# Patient Record
Sex: MALE | Race: WHITE | NOT HISPANIC OR LATINO | Employment: FULL TIME | URBAN - METROPOLITAN AREA
[De-identification: names, ages, dates, MRNs, and addresses within clinical notes are randomized per-mention and may not be internally consistent; named-entity substitution may affect disease eponyms.]

---

## 2019-03-16 RX ORDER — ESCITALOPRAM OXALATE 20 MG/1
20 TABLET ORAL DAILY
Qty: 30 TABLET | Refills: 11 | Status: SHIPPED | OUTPATIENT
Start: 2019-03-16 | End: 2019-03-16

## 2019-03-16 RX ORDER — ESCITALOPRAM OXALATE 20 MG/1
TABLET ORAL
Qty: 30 TABLET | Refills: 11 | Status: SHIPPED | OUTPATIENT
Start: 2019-03-16 | End: 2019-05-15

## 2019-05-15 RX ORDER — ESCITALOPRAM OXALATE 10 MG/1
10 TABLET ORAL DAILY
Qty: 30 TABLET | Refills: 11 | Status: SHIPPED | OUTPATIENT
Start: 2019-05-15 | End: 2021-07-21 | Stop reason: SDUPTHER

## 2019-08-16 ENCOUNTER — OFFICE VISIT (OUTPATIENT)
Dept: FAMILY MEDICINE | Facility: CLINIC | Age: 34
End: 2019-08-16
Payer: COMMERCIAL

## 2019-08-16 VITALS
WEIGHT: 190 LBS | TEMPERATURE: 97 F | SYSTOLIC BLOOD PRESSURE: 123 MMHG | OXYGEN SATURATION: 98 % | BODY MASS INDEX: 28.14 KG/M2 | HEART RATE: 73 BPM | DIASTOLIC BLOOD PRESSURE: 78 MMHG | HEIGHT: 69 IN | RESPIRATION RATE: 16 BRPM

## 2019-08-16 DIAGNOSIS — Z00.01 ENCOUNTER FOR PREVENTATIVE ADULT HEALTH CARE EXAM WITH ABNORMAL FINDINGS: Primary | ICD-10-CM

## 2019-08-16 PROBLEM — F17.200 SMOKER: Status: RESOLVED | Noted: 2019-08-16 | Resolved: 2019-08-16

## 2019-08-16 PROCEDURE — 99395 PR PREVENTIVE VISIT,EST,18-39: ICD-10-PCS | Mod: S$GLB,,, | Performed by: NURSE PRACTITIONER

## 2019-08-16 PROCEDURE — 99395 PREV VISIT EST AGE 18-39: CPT | Mod: S$GLB,,, | Performed by: NURSE PRACTITIONER

## 2019-08-16 NOTE — PROGRESS NOTES
Subjective:      Patient ID: Ronnell Grande is a 33 y.o. male.    Chief Complaint: Follow-up (6mn f.u anxiety. ) and Annual Exam      Here for annual wellness visit.  No acute issues reported.  Tolerating all medications well.       ROS:   Review of Systems   Constitutional: Negative.    Respiratory: Negative.    Cardiovascular: Negative.    Gastrointestinal: Negative.    Genitourinary: Negative.    Musculoskeletal: Negative.    Neurological: Negative.    Psychiatric/Behavioral: Negative.      Objective:   Physical Exam   Constitutional: He is oriented to person, place, and time. He appears well-developed and well-nourished.   Eyes: Pupils are equal, round, and reactive to light. No scleral icterus.   Neck: Normal range of motion. Neck supple.   Cardiovascular: Normal rate, regular rhythm and normal heart sounds.   No murmur heard.  Pulmonary/Chest: Effort normal and breath sounds normal.   Abdominal: Soft. Bowel sounds are normal.   Musculoskeletal: Normal range of motion. He exhibits no edema.   Lymphadenopathy:     He has no cervical adenopathy.        Right: No supraclavicular adenopathy present.        Left: No supraclavicular adenopathy present.   Neurological: He is alert and oriented to person, place, and time.   Skin: Skin is warm and dry.   Psychiatric: He has a normal mood and affect. His behavior is normal. Judgment and thought content normal.   Nursing note and vitals reviewed.    Assessment:     1. Encounter for preventative adult health care exam with abnormal findings      Plan:     Problem List Items Addressed This Visit     None      Visit Diagnoses     Encounter for preventative adult health care exam with abnormal findings    -  Primary    Hold off on Labs until 6 mts. Wellness Screening form completed for work. RTC 6 mts.

## 2020-02-12 RX ORDER — DICLOFENAC POTASSIUM 50 MG/1
50 TABLET, FILM COATED ORAL 3 TIMES DAILY PRN
Qty: 30 TABLET | Refills: 0 | Status: SHIPPED | OUTPATIENT
Start: 2020-02-12 | End: 2020-02-19

## 2020-02-12 RX ORDER — CYCLOBENZAPRINE HCL 10 MG
10 TABLET ORAL 3 TIMES DAILY PRN
Qty: 30 TABLET | Refills: 0 | Status: SHIPPED | OUTPATIENT
Start: 2020-02-12 | End: 2020-02-22

## 2020-02-19 RX ORDER — DICLOFENAC POTASSIUM 50 MG/1
TABLET, FILM COATED ORAL
Qty: 30 TABLET | Refills: 3 | Status: SHIPPED | OUTPATIENT
Start: 2020-02-19 | End: 2020-05-21 | Stop reason: SDUPTHER

## 2020-05-21 ENCOUNTER — TELEPHONE (OUTPATIENT)
Dept: FAMILY MEDICINE | Facility: CLINIC | Age: 35
End: 2020-05-21

## 2020-05-21 RX ORDER — DICLOFENAC POTASSIUM 50 MG/1
TABLET, FILM COATED ORAL
Qty: 30 TABLET | Refills: 3 | Status: SHIPPED | OUTPATIENT
Start: 2020-05-21 | End: 2023-10-18

## 2020-05-21 RX ORDER — CYCLOBENZAPRINE HCL 10 MG
10 TABLET ORAL 3 TIMES DAILY PRN
Qty: 30 TABLET | Refills: 0 | Status: SHIPPED | OUTPATIENT
Start: 2020-05-21 | End: 2020-05-31

## 2020-05-22 ENCOUNTER — OFFICE VISIT (OUTPATIENT)
Dept: FAMILY MEDICINE | Facility: CLINIC | Age: 35
End: 2020-05-22
Payer: COMMERCIAL

## 2020-05-22 VITALS
HEIGHT: 69 IN | BODY MASS INDEX: 28.14 KG/M2 | WEIGHT: 190 LBS | RESPIRATION RATE: 16 BRPM | TEMPERATURE: 99 F | DIASTOLIC BLOOD PRESSURE: 77 MMHG | OXYGEN SATURATION: 99 % | HEART RATE: 82 BPM | SYSTOLIC BLOOD PRESSURE: 122 MMHG

## 2020-05-22 DIAGNOSIS — M54.42 ACUTE LEFT-SIDED LOW BACK PAIN WITH LEFT-SIDED SCIATICA: ICD-10-CM

## 2020-05-22 DIAGNOSIS — M54.16 LEFT LUMBAR RADICULOPATHY: Primary | ICD-10-CM

## 2020-05-22 PROCEDURE — 3008F PR BODY MASS INDEX (BMI) DOCUMENTED: ICD-10-PCS | Mod: CPTII,S$GLB,, | Performed by: NURSE PRACTITIONER

## 2020-05-22 PROCEDURE — 96372 THER/PROPH/DIAG INJ SC/IM: CPT | Mod: S$GLB,,, | Performed by: NURSE PRACTITIONER

## 2020-05-22 PROCEDURE — 99213 OFFICE O/P EST LOW 20 MIN: CPT | Mod: 25,S$GLB,, | Performed by: NURSE PRACTITIONER

## 2020-05-22 PROCEDURE — 96372 PR INJECTION,THERAP/PROPH/DIAG2ST, IM OR SUBCUT: ICD-10-PCS | Mod: S$GLB,,, | Performed by: NURSE PRACTITIONER

## 2020-05-22 PROCEDURE — 3008F BODY MASS INDEX DOCD: CPT | Mod: CPTII,S$GLB,, | Performed by: NURSE PRACTITIONER

## 2020-05-22 PROCEDURE — 99213 PR OFFICE/OUTPT VISIT, EST, LEVL III, 20-29 MIN: ICD-10-PCS | Mod: 25,S$GLB,, | Performed by: NURSE PRACTITIONER

## 2020-05-22 RX ORDER — BETAMETHASONE SODIUM PHOSPHATE AND BETAMETHASONE ACETATE 3; 3 MG/ML; MG/ML
12 INJECTION, SUSPENSION INTRA-ARTICULAR; INTRALESIONAL; INTRAMUSCULAR; SOFT TISSUE
Status: COMPLETED | OUTPATIENT
Start: 2020-05-22 | End: 2020-05-22

## 2020-05-22 RX ORDER — BETAMETHASONE SODIUM PHOSPHATE AND BETAMETHASONE ACETATE 3; 3 MG/ML; MG/ML
12 INJECTION, SUSPENSION INTRA-ARTICULAR; INTRALESIONAL; INTRAMUSCULAR; SOFT TISSUE ONCE
Qty: 2 ML | Refills: 0 | Status: SHIPPED | OUTPATIENT
Start: 2020-05-22 | End: 2020-05-22 | Stop reason: CLARIF

## 2020-05-22 RX ADMIN — BETAMETHASONE SODIUM PHOSPHATE AND BETAMETHASONE ACETATE 12 MG: 3; 3 INJECTION, SUSPENSION INTRA-ARTICULAR; INTRALESIONAL; INTRAMUSCULAR; SOFT TISSUE at 10:05

## 2020-05-22 NOTE — ASSESSMENT & PLAN NOTE
2nd occurrence in a yr.  Will give Celestone on L side.  Continue Flexeril as prescribed.  Hold diclofenac until Monday.     If he continues to have this issue moving forward, will recommend imaging versus referral to Dr. Conde for injection

## 2020-05-22 NOTE — PROGRESS NOTES
Subjective:      Patient ID: Ronnell Grande is a 34 y.o. male.    Chief Complaint: Back Pain (Low back pain; Lt side mostly; Numbness since Monday.)      34-year-old male with a history of lower back pain here today for an acute exacerbation with radiation down the left leg.  This is his 2nd occurrence in a year.   This occurred about a week ago while he was doing burpees at [employer].  Patient states that he hyperextended his back and immediately felt a pain in the left side.  Pain is worse with bending and twisting.  Radiates all the way down to his left calf.  Sitting is unaffected.  Denies bowel or bladder incontinence.  Positive tenderness in the left glued medius and Lumbar region. No other issues reported.     Past Medical History:   Diagnosis Date    EBV positive mononucleosis syndrome     Generalized anxiety disorder     Low vitamin D level     Smoker       Social History     Socioeconomic History    Marital status:      Spouse name: Not on file    Number of children: Not on file    Years of education: Not on file    Highest education level: Not on file   Occupational History    Not on file   Social Needs    Financial resource strain: Not on file    Food insecurity:     Worry: Not on file     Inability: Not on file    Transportation needs:     Medical: Not on file     Non-medical: Not on file   Tobacco Use    Smoking status: Current Some Day Smoker     Types: Cigarettes    Smokeless tobacco: Never Used   Substance and Sexual Activity    Alcohol use: Never    Drug use: Never    Sexual activity: Not on file   Lifestyle    Physical activity:     Days per week: Not on file     Minutes per session: Not on file    Stress: Not on file   Relationships    Social connections:     Talks on phone: Not on file     Gets together: Not on file     Attends [employer] service: Not on file     Active member of club or organization: Not on file     Attends meetings of clubs or organizations: Not on  file     Relationship status: Not on file   Other Topics Concern    Not on file   Social History Narrative    Not on file      Family History   Family history unknown: Yes        ROS:   Review of Systems   Constitutional: Negative.    Gastrointestinal: Negative.    Genitourinary: Negative.    Musculoskeletal: Positive for back pain. Negative for arthralgias, gait problem, joint swelling, myalgias, neck pain and neck stiffness.   Neurological: Negative.      Objective:   Physical Exam   Constitutional: He is oriented to person, place, and time. He appears well-developed and well-nourished. No distress.   Cardiovascular: Normal rate.   Pulmonary/Chest: Effort normal.   Musculoskeletal:        Lumbar back: He exhibits decreased range of motion, tenderness and spasm. He exhibits no bony tenderness, no swelling, no edema and no deformity.        Back:    Neurological: He is alert and oriented to person, place, and time.   Skin: Skin is warm.   Nursing note and vitals reviewed.    Assessment:     1. Left lumbar radiculopathy    2. Acute left-sided low back pain with left-sided sciatica      No images are attached to the encounter.   Plan:     Problem List Items Addressed This Visit        Neuro    Left lumbar radiculopathy - Primary    Current Assessment & Plan     2nd occurrence in a yr.  Will give Celestone on L side.  Continue Flexeril as prescribed.  Hold diclofenac until Monday.     If he continues to have this issue moving forward, will recommend imaging versus referral to Dr. Conde for injection         Relevant Medications    betamethasone acetate-betamethasone sodium phosphate injection 12 mg (Completed)      Other Visit Diagnoses     Acute left-sided low back pain with left-sided sciatica        Relevant Medications    betamethasone acetate-betamethasone sodium phosphate injection 12 mg (Completed)        Procedures     Follow up:     There are no Patient Instructions on file for this visit.     Follow up  if symptoms worsen or fail to improve.

## 2020-12-09 ENCOUNTER — TELEPHONE (OUTPATIENT)
Dept: FAMILY MEDICINE | Facility: CLINIC | Age: 35
End: 2020-12-09

## 2021-08-18 ENCOUNTER — TELEPHONE (OUTPATIENT)
Dept: FAMILY MEDICINE | Facility: CLINIC | Age: 36
End: 2021-08-18

## 2021-08-18 DIAGNOSIS — Z00.00 PREVENTATIVE HEALTH CARE: Primary | ICD-10-CM

## 2021-08-27 ENCOUNTER — OFFICE VISIT (OUTPATIENT)
Dept: FAMILY MEDICINE | Facility: CLINIC | Age: 36
End: 2021-08-27
Payer: COMMERCIAL

## 2021-08-27 VITALS
BODY MASS INDEX: 28.88 KG/M2 | SYSTOLIC BLOOD PRESSURE: 127 MMHG | RESPIRATION RATE: 16 BRPM | OXYGEN SATURATION: 98 % | HEART RATE: 71 BPM | HEIGHT: 69 IN | DIASTOLIC BLOOD PRESSURE: 70 MMHG | WEIGHT: 195 LBS

## 2021-08-27 DIAGNOSIS — R77.1 ABNORMALITY OF GLOBULIN: ICD-10-CM

## 2021-08-27 DIAGNOSIS — Z00.00 PREVENTATIVE HEALTH CARE: Primary | ICD-10-CM

## 2021-08-27 PROCEDURE — 99395 PR PREVENTIVE VISIT,EST,18-39: ICD-10-PCS | Mod: S$GLB,,, | Performed by: NURSE PRACTITIONER

## 2021-08-27 PROCEDURE — 3078F PR MOST RECENT DIASTOLIC BLOOD PRESSURE < 80 MM HG: ICD-10-PCS | Mod: CPTII,S$GLB,, | Performed by: NURSE PRACTITIONER

## 2021-08-27 PROCEDURE — 99395 PREV VISIT EST AGE 18-39: CPT | Mod: S$GLB,,, | Performed by: NURSE PRACTITIONER

## 2021-08-27 PROCEDURE — 3074F SYST BP LT 130 MM HG: CPT | Mod: CPTII,S$GLB,, | Performed by: NURSE PRACTITIONER

## 2021-08-27 PROCEDURE — 1159F MED LIST DOCD IN RCRD: CPT | Mod: CPTII,S$GLB,, | Performed by: NURSE PRACTITIONER

## 2021-08-27 PROCEDURE — 3074F PR MOST RECENT SYSTOLIC BLOOD PRESSURE < 130 MM HG: ICD-10-PCS | Mod: CPTII,S$GLB,, | Performed by: NURSE PRACTITIONER

## 2021-08-27 PROCEDURE — 1159F PR MEDICATION LIST DOCUMENTED IN MEDICAL RECORD: ICD-10-PCS | Mod: CPTII,S$GLB,, | Performed by: NURSE PRACTITIONER

## 2021-08-27 PROCEDURE — 3078F DIAST BP <80 MM HG: CPT | Mod: CPTII,S$GLB,, | Performed by: NURSE PRACTITIONER

## 2021-08-27 PROCEDURE — 3008F BODY MASS INDEX DOCD: CPT | Mod: CPTII,S$GLB,, | Performed by: NURSE PRACTITIONER

## 2021-08-27 PROCEDURE — 1126F AMNT PAIN NOTED NONE PRSNT: CPT | Mod: CPTII,S$GLB,, | Performed by: NURSE PRACTITIONER

## 2021-08-27 PROCEDURE — 1126F PR PAIN SEVERITY QUANTIFIED, NO PAIN PRESENT: ICD-10-PCS | Mod: CPTII,S$GLB,, | Performed by: NURSE PRACTITIONER

## 2021-08-27 PROCEDURE — 3008F PR BODY MASS INDEX (BMI) DOCUMENTED: ICD-10-PCS | Mod: CPTII,S$GLB,, | Performed by: NURSE PRACTITIONER

## 2021-11-29 PROBLEM — Z00.00 PREVENTATIVE HEALTH CARE: Status: RESOLVED | Noted: 2021-08-27 | Resolved: 2021-11-29

## 2022-01-05 RX ORDER — TRIAMCINOLONE ACETONIDE 1 MG/G
CREAM TOPICAL 2 TIMES DAILY
Qty: 45 G | Refills: 11 | Status: SHIPPED | OUTPATIENT
Start: 2022-01-05 | End: 2023-10-18

## 2022-03-22 RX ORDER — KETOCONAZOLE 20 MG/G
CREAM TOPICAL DAILY
Qty: 30 G | Refills: 2 | Status: SHIPPED | OUTPATIENT
Start: 2022-03-22 | End: 2023-10-18

## 2022-03-22 RX ORDER — TERBINAFINE HYDROCHLORIDE 250 MG/1
250 TABLET ORAL DAILY
Qty: 14 TABLET | Refills: 0 | Status: SHIPPED | OUTPATIENT
Start: 2022-03-22 | End: 2022-04-05

## 2022-04-21 DIAGNOSIS — R21 SCALY PATCH RASH: Primary | ICD-10-CM

## 2022-04-21 RX ORDER — ITRACONAZOLE 100 MG/1
200 CAPSULE ORAL DAILY
Qty: 28 CAPSULE | Refills: 0 | Status: SHIPPED | OUTPATIENT
Start: 2022-04-21 | End: 2022-05-05

## 2022-09-19 DIAGNOSIS — R19.7 DIARRHEA, UNSPECIFIED TYPE: ICD-10-CM

## 2022-09-19 DIAGNOSIS — R10.32 LLQ ABDOMINAL PAIN: Primary | ICD-10-CM

## 2022-09-19 DIAGNOSIS — R68.83 CHILLS (WITHOUT FEVER): ICD-10-CM

## 2023-01-06 ENCOUNTER — PATIENT OUTREACH (OUTPATIENT)
Dept: ADMINISTRATIVE | Facility: HOSPITAL | Age: 38
End: 2023-01-06
Payer: COMMERCIAL

## 2023-05-29 ENCOUNTER — PATIENT MESSAGE (OUTPATIENT)
Dept: ADMINISTRATIVE | Facility: HOSPITAL | Age: 38
End: 2023-05-29
Payer: COMMERCIAL

## 2023-06-21 RX ORDER — ESCITALOPRAM OXALATE 10 MG/1
TABLET ORAL
Qty: 90 TABLET | Refills: 3 | Status: SHIPPED | OUTPATIENT
Start: 2023-06-21

## 2023-09-18 ENCOUNTER — PATIENT OUTREACH (OUTPATIENT)
Dept: ADMINISTRATIVE | Facility: HOSPITAL | Age: 38
End: 2023-09-18
Payer: COMMERCIAL

## 2023-09-18 NOTE — PROGRESS NOTES
PCP VISIT GREATER THAN 12 MO: spoke to pt, offered appt, pt accepted 10/18/23 with MONIQUE Hendrix NP.

## 2023-10-06 DIAGNOSIS — Z79.899 LONG TERM USE OF DRUG: ICD-10-CM

## 2023-10-06 DIAGNOSIS — Z13.220 SCREENING FOR LIPOID DISORDERS: ICD-10-CM

## 2023-10-06 DIAGNOSIS — Z00.00 PREVENTATIVE HEALTH CARE: Primary | ICD-10-CM

## 2023-10-06 DIAGNOSIS — Z11.4 SCREENING FOR HIV (HUMAN IMMUNODEFICIENCY VIRUS): ICD-10-CM

## 2023-10-06 DIAGNOSIS — Z91.89 ENCOUNTER FOR HEPATITIS C VIRUS SCREENING TEST FOR HIGH RISK PATIENT: ICD-10-CM

## 2023-10-06 DIAGNOSIS — Z11.59 ENCOUNTER FOR HEPATITIS C VIRUS SCREENING TEST FOR HIGH RISK PATIENT: ICD-10-CM

## 2023-10-06 DIAGNOSIS — Z13.1 SCREENING FOR DIABETES MELLITUS: ICD-10-CM

## 2023-10-10 LAB
ABS NRBC COUNT: 0 X 10 3/UL (ref 0–0.01)
ABSOLUTE BASOPHIL: 0.06 X 10 3/UL (ref 0–0.22)
ABSOLUTE EOSINOPHIL: 0.05 X 10 3/UL (ref 0.04–0.54)
ABSOLUTE IMMATURE GRAN: 0.03 X 10 3/UL (ref 0–0.04)
ABSOLUTE LYMPHOCYTE: 1.87 X 10 3/UL (ref 0.86–4.75)
ABSOLUTE MONOCYTE: 0.59 X 10 3/UL (ref 0.22–1.08)
ALBUMIN SERPL-MCNC: 4.7 G/DL (ref 3.5–5.2)
ALBUMIN/GLOB SERPL ELPH: 2 {RATIO} (ref 1–2.7)
ALP ISOS SERPL LEV INH-CCNC: 91 U/L (ref 40–130)
ALT (SGPT): 23 U/L (ref 0–41)
ANION GAP SERPL CALC-SCNC: 10 MMOL/L (ref 8–17)
AST SERPL-CCNC: 24 U/L (ref 0–40)
BASOPHILS NFR BLD: 0.6 % (ref 0.2–1.2)
BILIRUBIN, TOTAL: 0.49 MG/DL (ref 0–1.2)
BUN/CREAT SERPL: 17.7 (ref 6–20)
CALCIUM SERPL-MCNC: 10 MG/DL (ref 8.6–10.2)
CARBON DIOXIDE, CO2: 29 MMOL/L (ref 22–29)
CHLORIDE: 103 MMOL/L (ref 98–107)
CHOLEST SERPL-MSCNC: 188 MG/DL (ref 100–200)
CREAT SERPL-MCNC: 1.03 MG/DL (ref 0.7–1.2)
EOSINOPHIL NFR BLD: 0.5 % (ref 0.7–7)
ESTIMATED AVERAGE GLUCOSE: 107 MG/DL
GFR ESTIMATION: 94.76 ML/MIN/1.73M2
GLOBULIN: 2.4 G/DL (ref 1.5–4.5)
GLUCOSE: 82 MG/DL (ref 74–106)
HBA1C MFR BLD: 5.4 % (ref 4–6)
HCT VFR BLD AUTO: 47.7 % (ref 42–52)
HCV IGG SERPL QL IA: NONREACTIVE
HDLC SERPL-MCNC: 66 MG/DL
HGB BLD-MCNC: 15.8 G/DL (ref 14–18)
HIV 1+2 AB+HIV1 P24 AG SERPL QL IA: NONREACTIVE
IMMATURE GRANULOCYTES: 0.3 % (ref 0–0.5)
LDL/HDL RATIO: 1.7 (ref 1–3)
LDLC SERPL CALC-MCNC: 110.2 MG/DL (ref 0–100)
LYMPHOCYTES NFR BLD: 18.1 % (ref 19.3–53.1)
MCH RBC QN AUTO: 28.9 PG (ref 27–32)
MCHC RBC AUTO-ENTMCNC: 33.1 G/DL (ref 32–36)
MCV RBC AUTO: 87.4 FL (ref 80–94)
MONOCYTES NFR BLD: 5.7 % (ref 4.7–12.5)
NEUTROPHILS # BLD AUTO: 7.74 X 10 3/UL (ref 2.15–7.56)
NEUTROPHILS NFR BLD: 74.8 % (ref 34–71.1)
NUCLEATED RED BLOOD CELLS: 0 /100 WBC (ref 0–0.2)
PLATELET # BLD AUTO: 283 X 10 3/UL (ref 135–400)
POTASSIUM: 5.2 MMOL/L (ref 3.5–5.1)
PROT SNV-MCNC: 7.1 G/DL (ref 6.4–8.3)
RBC # BLD AUTO: 5.46 X 10 6/UL (ref 4.7–6.1)
RDW-SD: 42.3 FL (ref 37–54)
SODIUM: 142 MMOL/L (ref 136–145)
T4, FREE: 1.61 NG/DL (ref 0.93–1.7)
TRIGL SERPL-MCNC: 59 MG/DL (ref 0–150)
TSH SERPL DL<=0.005 MIU/L-ACNC: 1.45 UIU/ML (ref 0.27–4.2)
UREA NITROGEN (BUN): 18.2 MG/DL (ref 6–20)
WBC # BLD: 10.34 X 10 3/UL (ref 4.3–10.8)

## 2023-10-18 ENCOUNTER — OFFICE VISIT (OUTPATIENT)
Dept: FAMILY MEDICINE | Facility: CLINIC | Age: 38
End: 2023-10-18
Payer: COMMERCIAL

## 2023-10-18 VITALS
OXYGEN SATURATION: 97 % | HEART RATE: 78 BPM | WEIGHT: 185 LBS | SYSTOLIC BLOOD PRESSURE: 130 MMHG | DIASTOLIC BLOOD PRESSURE: 71 MMHG | BODY MASS INDEX: 27.4 KG/M2 | HEIGHT: 69 IN

## 2023-10-18 DIAGNOSIS — E78.00 ELEVATED LDL CHOLESTEROL LEVEL: ICD-10-CM

## 2023-10-18 DIAGNOSIS — Z71.2 ENCOUNTER TO DISCUSS TEST RESULTS: ICD-10-CM
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
## 2023-10-18 DIAGNOSIS — Z00.00 PREVENTATIVE HEALTH CARE: Primary | ICD-10-CM

## 2023-10-18 PROCEDURE — 3078F PR MOST RECENT DIASTOLIC BLOOD PRESSURE < 80 MM HG: ICD-10-PCS | Mod: CPTII,S$GLB,, | Performed by: NURSE PRACTITIONER

## 2023-10-18 PROCEDURE — 3008F PR BODY MASS INDEX (BMI) DOCUMENTED: ICD-10-PCS | Mod: CPTII,S$GLB,, | Performed by: NURSE PRACTITIONER

## 2023-10-18 PROCEDURE — 3078F DIAST BP <80 MM HG: CPT | Mod: CPTII,S$GLB,, | Performed by: NURSE PRACTITIONER

## 2023-10-18 PROCEDURE — 3075F SYST BP GE 130 - 139MM HG: CPT | Mod: CPTII,S$GLB,, | Performed by: NURSE PRACTITIONER

## 2023-10-18 PROCEDURE — 3044F PR MOST RECENT HEMOGLOBIN A1C LEVEL <7.0%: ICD-10-PCS | Mod: CPTII,S$GLB,, | Performed by: NURSE PRACTITIONER

## 2023-10-18 PROCEDURE — 3044F HG A1C LEVEL LT 7.0%: CPT | Mod: CPTII,S$GLB,, | Performed by: NURSE PRACTITIONER

## 2023-10-18 PROCEDURE — 1159F MED LIST DOCD IN RCRD: CPT | Mod: CPTII,S$GLB,, | Performed by: NURSE PRACTITIONER

## 2023-10-18 PROCEDURE — 99395 PREV VISIT EST AGE 18-39: CPT | Mod: S$GLB,,, | Performed by: NURSE PRACTITIONER

## 2023-10-18 PROCEDURE — 1159F PR MEDICATION LIST DOCUMENTED IN MEDICAL RECORD: ICD-10-PCS | Mod: CPTII,S$GLB,, | Performed by: NURSE PRACTITIONER

## 2023-10-18 PROCEDURE — 99395 PR PREVENTIVE VISIT,EST,18-39: ICD-10-PCS | Mod: S$GLB,,, | Performed by: NURSE PRACTITIONER

## 2023-10-18 PROCEDURE — 3075F PR MOST RECENT SYSTOLIC BLOOD PRESS GE 130-139MM HG: ICD-10-PCS | Mod: CPTII,S$GLB,, | Performed by: NURSE PRACTITIONER

## 2023-10-18 PROCEDURE — 3008F BODY MASS INDEX DOCD: CPT | Mod: CPTII,S$GLB,, | Performed by: NURSE PRACTITIONER

## 2023-10-18 NOTE — PATIENT INSTRUCTIONS
"GUIDELINES FOR LOW CHOLESTEROL, LOW-TRIGLYCERIDE DIETS  FOODS TO AVOID    MEATS & FISH Marbled beef, pork, brannon, sausage, and other pork products; fatty fowl (duck, goose); skin  and fat of turkey and chicken; processed meats; luncheon meats (salami, bologna); hot dogs  and fast-food hamburgers (they're loaded with fat); organ meats (kidneys, liver); canned fish  packed in oil.    EGGS Limit egg yolks to two per week.    FRUITS Coconuts (rich in saturated fats).  VEGETABLES Starchy vegetables (potatoes, corn, lima beans, dried peas. beans) may be used only if  substitutes for a serving of bread or cereal. (Baked potato skin, however, is desirable for its  fiber content.)    BEAN'S Commercial baked beans with sugar and or pork added.  NUTS Limit peanuts. Walnuts and almonds are more preferable type nuts.  BREADS & GRAINS Any baked goods with shortening and/or sugar. Commercial mixes with dried eggs and  whole milk. Avoid sweet rolls, doughnuts, breakfast pastries (Wol[place]). and sweetened  packaged cereals (the added sugar converts readily to triglycerides).    MILK PRODUCTS Whole milk and whole milk packaged goods; cream; ice cream: whole-milk puddings,  yogurt, or cheeses; nondairy cream substitutes.    FATS & OILS Butter, lard, animal fats, brannon drippings, gravies, cream sauces as well as palm and coconut  oils. All these are high in saturated fats. Examine labels on "cholesterol free-products for  hydrogenated fats" (These are oils that have been hardened into solids and in the process  have become saturated.)    DESSERTS Fried snack foods like potato chips; chocolate; candies in general; jams; jellies; &  & SNACKS syrups; whole-milk puddings; ice cream and milk sherbets; hydrogenated peanut butter.  BEVERAGES Sugared fruit juices and soft drinks; cocoa made with whole milk and or sugar. When using  alcohol (1/2 oz liquor, 12 oz beer, 5 oz dry table wine per serving "), one serving may be  substituted for one " bread or cereal serving (limit: two servings of alcohol per day).    SPECIAL NOTES  1. Remember that even non-limited foods should be used in moderation.  2. While on a cholesterol-lowering diet, be sure to avoid animal fats and marbled meats.  3. While on a triglyceride lowering diet, be sure to avoid sweets and to control the amount  of carbohydrates you eat (starchy foods such as flour, bread, and potatoes). Buy a good  low-fat cookbook, such as the one published by the American Heart Association.  4. Consult your physician if you have any questions.  SEE REVERSE SIDE FOR FOODS TO USE  My docs/Clinic/Guidelines for Low........(Yuliet Franco) Revised 10-26-11  GUIDELINES FOR LOW CHOLESTEROL, LOW-TRIGLYCERIDE DIETS  FOODS TO USE  MEAT & FISH Choose lean meats (chicken, turkey, veal, and nonfatty cuts of beef with excess fat trimmed;  one serving = 3 oz of cooked meat). Also, fresh or frozen fish, canned fish packed in water,  and shellfish (lobster, crabs, shrimp, oysters). Limit use to no more than one sensing of one  of these per week. Shellfish are high in cholesterol but low in saturated fat and should be  used sparingly. Meats and fish should be broiled (pan or oven) or baked on a rack.  EGGS Egg substitutes and egg whites (use freely). Egg yolks (limit two per week).  FRUITS Eat three servings of fresh fruit per day (1 serving = ½ cup). Be sure to have at least one  citrus fruit daily. Frozen or canned fruit with no sugar or syrup added may be used.  VEGETABLES Most vegetables are not limited (see reverse side). One dark green (string beans, escarole) or  one deep yellow (squash) vegetable is recommended daily. Cauliflower, broccoli, and  celery, as well as potato skins are recommended for their fiber content. (Fiber is associated  with cholesterol reduction.) It is preferable to steam vegetables, but they may be boiled,  strained, or braised with polyunsaturated vegetable oil (see below).  BEANS Dried peas or  beans (1 serving = ½ cup) may be used as a bread substitute.  NUTS Almonds, walnuts, and peanuts may be used sparingly (1 serving = 1 tablespoonful). Use  pumpkin, sesame, or sunflower seeds.  BREAD & GRAINS One roll or one slice of whole grain or enriched bread may be used, or three soda crackers  or four pieces of luis toast as a substitute. Spaghetti, rice or noodles (½ cup) or ½ large  ear of corn may be used as a bread substitute. In preparing these foods, do not use butter or  shortening, use soft margarine. Also use egg and sugar substitutes. Choose high fiber grains,  such as oats and whole wheat.  CEREALS Use ½ cup of hot cereal or ¾ cup of cold cereal per day. Add a sugar substitute if desired,  with 99% fat-free or skim milk.  MILK PRODUCTS Always use 99% fat free or skim milk, dairy products such as low fat cheeses (farmer's,  uncreamed diet cottage), low fat yogurt, and powdered skim milk.  FATS & OILS Use soft (not stick) margarine; vegetable oils that are high in polyunsaturated fats (such as  safflower, sunflower, soybean, corn, and cottonseed). Always refrigerate meat drippings to  harden the fat and remove it before preparing gravies.  DESSERT Limit to two servings per day; substitute each serving for a bread/cereal serving: ice milk,  & SNACKS water sherbet (¼ cup); unflavored gelatin or gelatin flavored with sugar substitute (1/3  cup); pudding prepared with skim milk (½ cup); egg white souffles: unbuttered popcorn (1  1/2 cups). Substitute carob for chocolate.  BEVERAGES Fresh fruit juices (limit 4 oz per day); black coffee, plain or herbal teas; soft drinks with  sugar substitutes; club soda, preferably salt free; cocoa made with skim milk; or nonfat dried  milk and water (sugar substitute added if desired); clear broth. Alcohol: limit two servings  per day (see reverse side).  MISCELLANEOUS You may use the following freely: vinegar, spices, herbs, nonfat bouillon, mustard.  [employer]  sauce, soy sauce, flavoring essence.

## 2023-10-18 NOTE — PROGRESS NOTES
Subjective:      Patient ID: Ronnell Grande is a 37 y.o. male.    Chief Complaint: Follow-up      37-year-old male    Past medical history of vitamin-D deficiency and generalized anxiety disorder      Here for annual wellness visit.  He has quit smoking.  He reports he eating healthy and working out 5-6 days a week.  Feels well overall.  Patient had labs done prior to arrival is here to discuss them.  He is inquiring about nutritionist for his marathon.       No acute issues reported.  Tolerating all medications well.       Past Medical History:   Diagnosis Date    EBV positive mononucleosis syndrome     Generalized anxiety disorder     Low vitamin D level     Smoker       Social History     Socioeconomic History    Marital status:    Tobacco Use    Smoking status: Some Days     Types: Cigarettes    Smokeless tobacco: Never   Substance and Sexual Activity    Alcohol use: Never    Drug use: Never      Family History   Family history unknown: Yes        ROS:   Review of Systems   Constitutional:  Negative for activity change, appetite change, chills, fatigue, fever and unexpected weight change.   HENT:  Negative for congestion, dental problem, hearing loss, mouth sores, sore throat, trouble swallowing and voice change.    Eyes:  Negative for pain, redness and visual disturbance.   Respiratory:  Negative for cough, shortness of breath and wheezing.    Cardiovascular:  Negative for chest pain and leg swelling.   Gastrointestinal:  Negative for abdominal distention, abdominal pain, diarrhea, nausea and vomiting.   Endocrine: Negative for polyuria.   Genitourinary:  Negative for decreased urine volume, dysuria and frequency.   Musculoskeletal:  Negative for joint swelling and neck stiffness.   Skin:  Negative for rash.   Allergic/Immunologic: Negative for immunocompromised state.   Neurological:  Negative for dizziness, weakness and headaches.   Hematological:  Negative for adenopathy.   Psychiatric/Behavioral:   Negative for agitation, confusion, hallucinations and suicidal ideas. The patient is not nervous/anxious.    All other systems reviewed and are negative.    Objective:   Physical Exam  Vitals and nursing note reviewed.   Constitutional:       General: He is not in acute distress.     Appearance: Normal appearance. He is well-developed. He is not ill-appearing.   HENT:      Head: Normocephalic and atraumatic.      Right Ear: External ear normal.      Left Ear: External ear normal.      Nose: Nose normal.      Mouth/Throat:      Mouth: Mucous membranes are moist.   Eyes:      General: No scleral icterus.     Pupils: Pupils are equal, round, and reactive to light.   Cardiovascular:      Rate and Rhythm: Normal rate and regular rhythm.      Pulses: Normal pulses.      Heart sounds: Normal heart sounds. No murmur heard.  Pulmonary:      Effort: Pulmonary effort is normal.      Breath sounds: Normal breath sounds.   Abdominal:      General: Bowel sounds are normal.      Palpations: Abdomen is soft.   Musculoskeletal:         General: Normal range of motion.      Cervical back: Normal range of motion and neck supple.   Lymphadenopathy:      Cervical: No cervical adenopathy.      Upper Body:      Right upper body: No supraclavicular adenopathy.      Left upper body: No supraclavicular adenopathy.   Skin:     General: Skin is warm and dry.      Capillary Refill: Capillary refill takes less than 2 seconds.   Neurological:      Mental Status: He is alert and oriented to person, place, and time. Mental status is at baseline.   Psychiatric:         Attention and Perception: Attention and perception normal.         Mood and Affect: Mood and affect normal.         Speech: Speech normal.         Behavior: Behavior normal. Behavior is cooperative.         Thought Content: Thought content normal.         Cognition and Memory: Cognition and memory normal.         Judgment: Judgment normal.       Assessment:     1. Preventative health  care    2. Elevated LDL cholesterol level    3. Activity involving physical training    4. Encounter to discuss test results      No images are attached to the encounter.   Plan:     Problem List Items Addressed This Visit    None  Visit Diagnoses       Preventative health care    -  Primary    UTD on screenings.     Elevated LDL cholesterol level        See AVS     Activity involving physical training        Relevant Orders    Ambulatory referral/consult to Nutrition Services    Encounter to discuss test results        All results discussed.           Procedures     Orders Only on 10/06/2023   Component Date Value Ref Range Status    HCV Ab 10/10/2023 NONREACTIVE  NONREACTIVE Final    Comment: NOTE  Testing performed at:  [facility], [address] [place] [address]  [address] CLIA #:[id number]      HIV 1/2 Ag/Ab 10/10/2023 NONREACTIVE  NONREACTIVE Final    Comment: A reactive result does not distinguish between HIV-1 p24 antigen,  HIV-1 antibody, HIV-2 antibody, and HIV-1 group O antibody.  All reactive samples are repeated for verification and then sent to  the reference laboratory for confirmation per CDC recommendation.  Please do not interpret as REACTIVE until confirmation testing is  complete.  NOTE  Testing performed at:  [facility], [address] [place] [address]  [address] CLIA #:[id number]      WBC 10/10/2023 10.34  4.3 - 10.8 X 10 3/ul Final    RBC 10/10/2023 5.46  4.7 - 6.1 X 10 6/ul Final    RDW-SD 10/10/2023 42.3  37 - 54 fl Final    Hemoglobin 10/10/2023 15.8  14 - 18 g/dL Final    Hematocrit 10/10/2023 47.7  42 - 52 % Final    MCV 10/10/2023 87.4  80 - 94 fl Final    MCH 10/10/2023 28.9  27 - 32 pg Final    MCHC 10/10/2023 33.1  32 - 36 g/dL Final    Platelets 10/10/2023 283  135 - 400 X 10 3/ul Final    Neutrophils 10/10/2023 74.8 (H)  34 - 71.1 % Final    Lymphocytes 10/10/2023 18.1 (L)  19.3 - 53.1 % Final    Monocytes 10/10/2023 5.7  4.7 - 12.5 % Final    Eosinophils  10/10/2023 0.5 (L)  0.7 - 7.0 % Final    Basophils 10/10/2023 0.6  0.2 - 1.2 % Final    Neutrophils Absolute 10/10/2023 7.74 (H)  2.15 - 7.56 X 10 3/ul Final    Lymphocytes Absolute 10/10/2023 1.87  0.86 - 4.75 X 10 3/ul Final    Monocytes Absolute 10/10/2023 0.59  0.22 - 1.08 X 10 3/ul Final    Eosinophils Absolute 10/10/2023 0.05  0.04 - 0.54 X 10 3/ul Final    Basophils Absolute 10/10/2023 0.06  0.00 - 0.22 X 10 3/ul Final    Immature Granulocytes Absolute 10/10/2023 0.03  0 - 0.04 X 10 3/ul Final    Immature Granulocytes 10/10/2023 0.3  0 - 0.5 % Final    IG includes metamyelocytes, myelocytes, and promyelocytes    nRBC# 10/10/2023 0.0  0 - 0.2 /100 WBC Final    nRBC Count Absolute 10/10/2023 0.000  0 - 0.012 x 10 3/ul Final    Comment: NOTE  Testing performed at:  [facility], [address] [place] [address] CLIA #:[id number]      Glucose 10/10/2023 82  74 - 106 mg/dL Final    BUN 10/10/2023 18.2  6 - 20 mg/dL Final    Creatinine 10/10/2023 1.03  0.70 - 1.20 mg/dL Final    AST 10/10/2023 24  0 - 40 U/L Final    ALT (SGPT) 10/10/2023 23  0 - 41 U/L Final    Alkaline Phosphatase 10/10/2023 91  40 - 130 U/L Final    Calcium 10/10/2023 10.0  8.6 - 10.2 mg/dL Final    Protein, Total 10/10/2023 7.1  6.4 - 8.3 g/dL Final    Albumin 10/10/2023 4.7  3.5 - 5.2 g/dL Final    BILIRUBIN, TOTAL 10/10/2023 0.49  0.00 - 1.20 mg/dL Final    Sodium 10/10/2023 142  136 - 145 mmol/L Final    Potassium 10/10/2023 5.2 (H)  3.5 - 5.1 mmol/L Final    Chloride 10/10/2023 103  98 - 107 mmol/L Final    CO2 10/10/2023 29  22 - 29 mmol/L Final    Globulin 10/10/2023 2.4  1.5 - 4.5 g/dL Final    Albumin/Globulin Ratio 10/10/2023 2.0  1.0 - 2.7 Final    BUN/Creatinine Ratio 10/10/2023 17.7  6 - 20 Final    GFR ESTIMATION 10/10/2023 94.76  >60.00 mL/min/1.73m2 Final    Anion Gap 10/10/2023 10.0  8.0 - 17.0 mmol/L Final    Comment: NOTE  Testing performed at:  [facility], [address] [place] [address]  [address]  CLIA #:[id number]      Hemoglobin A1C 10/10/2023 5.4  4.0 - 6.0 % Final    EST AVERAGE GLUCOSE 10/10/2023 107  NORMAL MG/DL Final    Comment: NOTE  Testing performed at:  [facility], [address] [place] [address]  [address] CLIA #:[id number]      Cholesterol 10/10/2023 188  100 - 200 mg/dL Final    Comment: Desirable  <200  Borderline 200-240  High risk  >240      Triglycerides 10/10/2023 59  0 - 150 mg/dL Final    HDL 10/10/2023 66  >60 mg/dL Final    Comment: <40 mg/dL Major risk factor for CHD  >60 mg/dL Negative risk factor for CHD      LDL Cholesterol 10/10/2023 110.2 (H)  0 - 100 mg/dL Final    LDL/HDL Ratio 10/10/2023 1.7  1 - 3 Final    Comment: NOTE  Testing performed at:  [facility], [address] [place] [address]  [address] CLIA #:[id number]      TSH 10/10/2023 1.45  0.27 - 4.20 uIU/mL Final    Comment: NOTE  Testing performed at:  [facility], [address] [place] [address]  [address] CLIA #:[id number]      T4, Free 10/10/2023 1.61  0.93 - 1.70 ng/dL Final    Comment: NOTE  Testing performed at:  [facility], [address] [place] [address]  [address] CLIA #:[id number]          No results found in the last 30 days.       Duration of encounter:  minutes  This includes face-to-face time and non face-to-face time preparing to see the patient (eg, review of tests), obtaining and/or reviewing separately obtained history, documenting clinical information in the electronic or other health record, independently interpreting resultsand communicating results to the patient/family/caregiver, or care coordination    All diagnostic data (labs/imaging) was reviewed with the patient and/or family member in the room.  All questions were answered to their liking. The patient and/or family member voiced understanding of all instructions provided. Expectations regarding follow up and treatment plan were voiced and confirmed prior to departure. The patient was given  "orders/instructions at the end of the visit for reference. They were instructed to notify my office if they have not been contacted for imaging/referrals/labs/results in 1-2 weeks. They voiced understanding of all of the above.     Follow up:     Patient Instructions   GUIDELINES FOR LOW CHOLESTEROL, LOW-TRIGLYCERIDE DIETS  FOODS TO AVOID    MEATS & FISH Marbled beef, pork, brannon, sausage, and other pork products; fatty fowl (duck, goose); skin  and fat of turkey and chicken; processed meats; luncheon meats (salami, bologna); hot dogs  and fast-food hamburgers (they're loaded with fat); organ meats (kidneys, liver); canned fish  packed in oil.    EGGS Limit egg yolks to two per week.    FRUITS Coconuts (rich in saturated fats).  VEGETABLES Starchy vegetables (potatoes, corn, lima beans, dried peas. beans) may be used only if  substitutes for a serving of bread or cereal. (Baked potato skin, however, is desirable for its  fiber content.)    BEAN'S Commercial baked beans with sugar and or pork added.  NUTS Limit peanuts. Walnuts and almonds are more preferable type nuts.  BREADS & GRAINS Any baked goods with shortening and/or sugar. Commercial mixes with dried eggs and  whole milk. Avoid sweet rolls, doughnuts, breakfast pastries (Mongolian). and sweetened  packaged cereals (the added sugar converts readily to triglycerides).    MILK PRODUCTS Whole milk and whole milk packaged goods; cream; ice cream: whole-milk puddings,  yogurt, or cheeses; nondairy cream substitutes.    FATS & OILS Butter, lard, animal fats, brannon drippings, gravies, cream sauces as well as palm and coconut  oils. All these are high in saturated fats. Examine labels on "cholesterol free-products for  hydrogenated fats" (These are oils that have been hardened into solids and in the process  have become saturated.)    DESSERTS Fried snack foods like potato chips; chocolate; candies in general; jams; jellies; &  & SNACKS syrups; whole-milk puddings; ice " "cream and milk sherbets; hydrogenated peanut butter.  BEVERAGES Sugared fruit juices and soft drinks; cocoa made with whole milk and or sugar. When using  alcohol (1/2 oz liquor, 12 oz beer, 5 oz dry table wine per serving "), one serving may be  substituted for one bread or cereal serving (limit: two servings of alcohol per day).    SPECIAL NOTES  1. Remember that even non-limited foods should be used in moderation.  2. While on a cholesterol-lowering diet, be sure to avoid animal fats and marbled meats.  3. While on a triglyceride lowering diet, be sure to avoid sweets and to control the amount  of carbohydrates you eat (starchy foods such as flour, bread, and potatoes). Buy a good  low-fat cookbook, such as the one published by the American Heart Association.  4. Consult your physician if you have any questions.  SEE REVERSE SIDE FOR FOODS TO USE  My docs/Clinic/Guidelines for Low........(Yuliet Franco) Revised 10-26-11  GUIDELINES FOR LOW CHOLESTEROL, LOW-TRIGLYCERIDE DIETS  FOODS TO USE  MEAT & FISH Choose lean meats (chicken, turkey, veal, and nonfatty cuts of beef with excess fat trimmed;  one serving = 3 oz of cooked meat). Also, fresh or frozen fish, canned fish packed in water,  and shellfish (lobster, crabs, shrimp, oysters). Limit use to no more than one sensing of one  of these per week. Shellfish are high in cholesterol but low in saturated fat and should be  used sparingly. Meats and fish should be broiled (pan or oven) or baked on a rack.  EGGS Egg substitutes and egg whites (use freely). Egg yolks (limit two per week).  FRUITS Eat three servings of fresh fruit per day (1 serving = ½ cup). Be sure to have at least one  citrus fruit daily. Frozen or canned fruit with no sugar or syrup added may be used.  VEGETABLES Most vegetables are not limited (see reverse side). One dark green (string beans, escarole) or  one deep yellow (squash) vegetable is recommended daily. Cauliflower, broccoli, and  celery, as " well as potato skins are recommended for their fiber content. (Fiber is associated  with cholesterol reduction.) It is preferable to steam vegetables, but they may be boiled,  strained, or braised with polyunsaturated vegetable oil (see below).  BEANS Dried peas or beans (1 serving = ½ cup) may be used as a bread substitute.  NUTS Almonds, walnuts, and peanuts may be used sparingly (1 serving = 1 tablespoonful). Use  pumpkin, sesame, or sunflower seeds.  BREAD & GRAINS One roll or one slice of whole grain or enriched bread may be used, or three soda crackers  or four pieces of luis toast as a substitute. Spaghetti, rice or noodles (½ cup) or ½ large  ear of corn may be used as a bread substitute. In preparing these foods, do not use butter or  shortening, use soft margarine. Also use egg and sugar substitutes. Choose high fiber grains,  such as oats and whole wheat.  CEREALS Use ½ cup of hot cereal or ¾ cup of cold cereal per day. Add a sugar substitute if desired,  with 99% fat-free or skim milk.  MILK PRODUCTS Always use 99% fat free or skim milk, dairy products such as low fat cheeses (farmer's,  uncreamed diet cottage), low fat yogurt, and powdered skim milk.  FATS & OILS Use soft (not stick) margarine; vegetable oils that are high in polyunsaturated fats (such as  safflower, sunflower, soybean, corn, and cottonseed). Always refrigerate meat drippings to  harden the fat and remove it before preparing gravies.  DESSERT Limit to two servings per day; substitute each serving for a bread/cereal serving: ice milk,  & SNACKS water sherbet (¼ cup); unflavored gelatin or gelatin flavored with sugar substitute (1/3  cup); pudding prepared with skim milk (½ cup); egg white souffles: unbuttered popcorn (1  1/2 cups). Substitute carob for chocolate.  BEVERAGES Fresh fruit juices (limit 4 oz per day); black coffee, plain or herbal teas; soft drinks with  sugar substitutes; club soda, preferably salt free; cocoa made with  skim milk; or nonfat dried  milk and water (sugar substitute added if desired); clear broth. Alcohol: limit two servings  per day (see reverse side).  MISCELLANEOUS You may use the following freely: vinegar, spices, herbs, nonfat bouillon, mustard.  Worcestershire sauce, soy sauce, flavoring essence.       Follow up in about 6 months (around 4/18/2024), or if symptoms worsen or fail to improve.

## 2023-11-29 RX ORDER — TRIAMCINOLONE ACETONIDE 1 MG/G
CREAM TOPICAL 2 TIMES DAILY
Qty: 45 G | Refills: 11 | Status: SHIPPED | OUTPATIENT
Start: 2023-11-29 | End: 2023-12-13

## 2024-04-08 RX ORDER — DICLOFENAC POTASSIUM 50 MG/1
50 TABLET, FILM COATED ORAL 3 TIMES DAILY PRN
Qty: 90 TABLET | Refills: 0 | Status: SHIPPED | OUTPATIENT
Start: 2024-04-08 | End: 2024-05-06

## 2024-05-06 RX ORDER — DICLOFENAC POTASSIUM 50 MG/1
TABLET, FILM COATED ORAL
Qty: 90 TABLET | Refills: 3 | Status: SHIPPED | OUTPATIENT
Start: 2024-05-06

## 2024-05-20 DIAGNOSIS — Z00.00 PREVENTATIVE HEALTH CARE: Primary | ICD-10-CM

## 2024-05-20 DIAGNOSIS — E55.9 VITAMIN D DEFICIENCY: ICD-10-CM

## 2024-05-20 DIAGNOSIS — Z13.1 SCREENING FOR DIABETES MELLITUS: ICD-10-CM

## 2024-05-20 DIAGNOSIS — R53.83 FATIGUE, UNSPECIFIED TYPE: ICD-10-CM

## 2024-05-20 DIAGNOSIS — E29.1 HYPOGONADISM MALE: ICD-10-CM

## 2024-05-20 DIAGNOSIS — Z79.899 LONG TERM USE OF DRUG: ICD-10-CM

## 2024-05-20 DIAGNOSIS — Z13.220 SCREENING FOR LIPOID DISORDERS: ICD-10-CM

## 2024-05-24 DIAGNOSIS — Z79.899 LONG TERM USE OF DRUG: ICD-10-CM

## 2024-05-24 DIAGNOSIS — N28.9 RENAL INSUFFICIENCY: ICD-10-CM

## 2024-05-24 DIAGNOSIS — R74.01 TRANSAMINITIS: Primary | ICD-10-CM

## 2024-05-24 LAB
ABS NRBC COUNT: 0 X 10 3/UL (ref 0–0.01)
ABSOLUTE BASOPHIL: 0.04 X 10 3/UL (ref 0–0.22)
ABSOLUTE EOSINOPHIL: 0.1 X 10 3/UL (ref 0.04–0.54)
ABSOLUTE IMMATURE GRAN: 0.01 X 10 3/UL (ref 0–0.04)
ABSOLUTE LYMPHOCYTE: 2.62 X 10 3/UL (ref 0.86–4.75)
ABSOLUTE MONOCYTE: 0.42 X 10 3/UL (ref 0.22–1.08)
ALBUMIN SERPL-MCNC: 4.4 G/DL (ref 3.5–5.2)
ALBUMIN/GLOB SERPL ELPH: 2 {RATIO} (ref 1–2.7)
ALP ISOS SERPL LEV INH-CCNC: 104 U/L (ref 40–130)
ALT (SGPT): 204 U/L (ref 0–41)
ANION GAP SERPL CALC-SCNC: 16 MMOL/L (ref 8–17)
AST SERPL-CCNC: 128 U/L (ref 0–40)
BASOPHILS NFR BLD: 0.7 % (ref 0.2–1.2)
BILIRUBIN, TOTAL: 0.35 MG/DL (ref 0–1.2)
BIOAVAILABLE TESTOSTERONE: 166 NG/DL (ref 80–614)
BUN/CREAT SERPL: 20.1 (ref 6–20)
CALCIUM SERPL-MCNC: 9.8 MG/DL (ref 8.6–10.2)
CARBON DIOXIDE, CO2: 21 MMOL/L (ref 22–29)
CHLORIDE: 103 MMOL/L (ref 98–107)
CHOLEST SERPL-MSCNC: 164 MG/DL (ref 100–200)
CREAT SERPL-MCNC: 1.32 MG/DL (ref 0.7–1.2)
EOSINOPHIL NFR BLD: 1.7 % (ref 0.7–7)
ESTIMATED AVERAGE GLUCOSE: 106 MG/DL
FREE TESTOSTERONE: 6.65 NG/DL (ref 1–26)
GFR ESTIMATION: 69.93 ML/MIN/1.73M2
GLOBULIN: 2.2 G/DL (ref 1.5–4.5)
GLUCOSE: 77 MG/DL (ref 74–106)
HBA1C MFR BLD: 5.3 % (ref 4–6)
HCT VFR BLD AUTO: 47 % (ref 42–52)
HDLC SERPL-MCNC: 70 MG/DL
HGB BLD-MCNC: 15.3 G/DL (ref 14–18)
IMMATURE GRANULOCYTES: 0.2 % (ref 0–0.5)
LDL/HDL RATIO: 1.2 (ref 1–3)
LDLC SERPL CALC-MCNC: 84 MG/DL (ref 0–100)
LYMPHOCYTES NFR BLD: 43.4 % (ref 19.3–53.1)
MCH RBC QN AUTO: 28.6 PG (ref 27–32)
MCHC RBC AUTO-ENTMCNC: 32.6 G/DL (ref 32–36)
MCV RBC AUTO: 87.9 FL (ref 80–94)
MONOCYTES NFR BLD: 7 % (ref 4.7–12.5)
NEUTROPHILS # BLD AUTO: 2.85 X 10 3/UL (ref 2.15–7.56)
NEUTROPHILS NFR BLD: 47 % (ref 34–71.1)
NUCLEATED RED BLOOD CELLS: 0 /100 WBC (ref 0–0.2)
PLATELET # BLD AUTO: 204 X 10 3/UL (ref 135–400)
POTASSIUM: 4.3 MMOL/L (ref 3.5–5.1)
PROT SNV-MCNC: 6.6 G/DL (ref 6.4–8.3)
RBC # BLD AUTO: 5.35 X 10 6/UL (ref 4.7–6.1)
RDW-SD: 43.6 FL (ref 37–54)
SHBG: 31 NMOL/L (ref 16.5–55.9)
SODIUM: 140 MMOL/L (ref 136–145)
T4, FREE: 1.55 NG/DL (ref 0.93–1.7)
TESTOST SERPL-MCNC: 333 NG/DL (ref 249–836)
TRIGL SERPL-MCNC: 50 MG/DL (ref 0–150)
TSH SERPL DL<=0.005 MIU/L-ACNC: 1.95 UIU/ML (ref 0.27–4.2)
UREA NITROGEN (BUN): 26.5 MG/DL (ref 6–20)
VITAMIN D (25OHD): 53 NG/ML
WBC # BLD: 6.04 X 10 3/UL (ref 4.3–10.8)

## 2024-06-03 LAB
ALBUMIN SERPL-MCNC: 4.3 G/DL (ref 3.5–5.2)
ALBUMIN/GLOB SERPL ELPH: 2 {RATIO} (ref 1–2.7)
ALP ISOS SERPL LEV INH-CCNC: 105 U/L (ref 40–130)
ALT (SGPT): 93 U/L (ref 0–41)
ANION GAP SERPL CALC-SCNC: 11 MMOL/L (ref 8–17)
AST SERPL-CCNC: 31 U/L (ref 0–40)
BILIRUBIN, TOTAL: 0.39 MG/DL (ref 0–1.2)
BUN/CREAT SERPL: 20.4 (ref 6–20)
CALCIUM SERPL-MCNC: 9.8 MG/DL (ref 8.6–10.2)
CARBON DIOXIDE, CO2: 26 MMOL/L (ref 22–29)
CHLORIDE: 103 MMOL/L (ref 98–107)
CREAT SERPL-MCNC: 1.14 MG/DL (ref 0.7–1.2)
GFR ESTIMATION: 83.38 ML/MIN/1.73M2
GLOBULIN: 2.2 G/DL (ref 1.5–4.5)
GLUCOSE: 86 MG/DL (ref 74–106)
POTASSIUM: 4.9 MMOL/L (ref 3.5–5.1)
PROT SNV-MCNC: 6.5 G/DL (ref 6.4–8.3)
SODIUM: 140 MMOL/L (ref 136–145)
UREA NITROGEN (BUN): 23.3 MG/DL (ref 6–20)

## 2024-06-03 NOTE — PROGRESS NOTES
Subjective:      Patient ID: Ronnell Grande is a 38 y.o. male.    Chief Complaint: No chief complaint on file.      38 year-old male    Past medical history of vitamin-D deficiency and generalized anxiety disorder      Here for annual wellness visit.   He reports he eating healthy and working out 5-6 days a week.  Feels well overall.  Patient had labs done prior to arrival is here to discuss them.  No acute issues reported.  Tolerating all medications well.       Past Medical History:   Diagnosis Date    EBV positive mononucleosis syndrome     Generalized anxiety disorder     Low vitamin D level     Smoker       Social History     Socioeconomic History    Marital status:    Tobacco Use    Smoking status: Some Days     Types: Cigarettes    Smokeless tobacco: Never   Substance and Sexual Activity    Alcohol use: Never    Drug use: Never     Social Determinants of Health     Financial Resource Strain: Low Risk  (6/3/2024)    Overall Financial Resource Strain (CARDIA)     Difficulty of Paying Living Expenses: Not hard at all   Food Insecurity: No Food Insecurity (6/3/2024)    Hunger Vital Sign     Worried About Running Out of Food in the Last Year: Never true     Ran Out of Food in the Last Year: Never true   Physical Activity: Sufficiently Active (6/3/2024)    Exercise Vital Sign     Days of Exercise per Week: 6 days     Minutes of Exercise per Session: 90 min   Stress: No Stress Concern Present (6/3/2024)    [employer] - Occupational Stress Questionnaire     Feeling of Stress : Only a little   Housing Stability: Unknown (6/3/2024)    Housing Stability Vital Sign     Unable to Pay for Housing in the Last Year: No      Family History   Family history unknown: Yes        ROS:   Review of Systems   Constitutional:  Negative for activity change, appetite change, chills, fatigue, fever and unexpected weight change.   HENT:  Negative for congestion, dental problem, hearing loss, mouth  sores, sore throat, trouble swallowing and voice change.    Eyes:  Negative for pain, redness and visual disturbance.   Respiratory:  Negative for cough, shortness of breath and wheezing.    Cardiovascular:  Negative for chest pain and leg swelling.   Gastrointestinal:  Negative for abdominal distention, abdominal pain, diarrhea, nausea and vomiting.   Endocrine: Negative for polyuria.   Genitourinary:  Negative for decreased urine volume, dysuria and frequency.   Musculoskeletal:  Negative for joint swelling and neck stiffness.   Skin:  Negative for rash.   Allergic/Immunologic: Negative for immunocompromised state.   Neurological:  Negative for dizziness, weakness and headaches.   Hematological:  Negative for adenopathy.   Psychiatric/Behavioral:  Negative for agitation, confusion, hallucinations and suicidal ideas. The patient is not nervous/anxious.    All other systems reviewed and are negative.    Objective:   Physical Exam  Vitals and nursing note reviewed.   Constitutional:       General: He is not in acute distress.     Appearance: Normal appearance. He is well-developed. He is not ill-appearing.   HENT:      Head: Normocephalic and atraumatic.      Right Ear: External ear normal.      Left Ear: External ear normal.      Nose: Nose normal.      Mouth/Throat:      Mouth: Mucous membranes are moist.   Eyes:      General: No scleral icterus.     Pupils: Pupils are equal, round, and reactive to light.   Cardiovascular:      Rate and Rhythm: Normal rate and regular rhythm.      Pulses: Normal pulses.      Heart sounds: Normal heart sounds. No murmur heard.  Pulmonary:      Effort: Pulmonary effort is normal.      Breath sounds: Normal breath sounds. No wheezing or rales.   Abdominal:      General: Abdomen is flat. Bowel sounds are normal.      Palpations: Abdomen is soft. There is no hepatomegaly.      Tenderness: There is no abdominal tenderness.   Musculoskeletal:         General: Normal range of motion.       Cervical back: Normal range of motion and neck supple.   Lymphadenopathy:      Cervical: No cervical adenopathy.      Upper Body:      Right upper body: No supraclavicular adenopathy.      Left upper body: No supraclavicular adenopathy.   Skin:     General: Skin is warm and dry.      Capillary Refill: Capillary refill takes less than 2 seconds.   Neurological:      Mental Status: He is alert and oriented to person, place, and time. Mental status is at baseline.   Psychiatric:         Attention and Perception: Attention and perception normal.         Mood and Affect: Mood and affect normal.         Speech: Speech normal.         Behavior: Behavior normal. Behavior is cooperative.         Thought Content: Thought content normal.         Cognition and Memory: Cognition and memory normal.         Judgment: Judgment normal.       Assessment:     1. Preventative health care    2. Transaminitis    3. Low testosterone      No images are attached to the encounter.   Plan:     Problem List Items Addressed This Visit          Endocrine    Low testosterone    Current Assessment & Plan       Free T/Total are borderline for age.     We discussed Clomid versus testosterone replacement therapy. See below for discussion.       Testosterone replacement therapy  Absolute contraindications are bladder cancer, DVT, prostate cancer, pulmonary embolism, heart disease.     Potential side effects of testosterone therapy were discussed including but not limited to:  Increase risk prostate cancer, erythrocytosis, venous thromboembolism, cardiovascular risk, skin irritation, liver function abnormalities, worsened BPH, neurocognitive changes.         PLAN    He would like to try OTC supplement. I can not comment on non-FDA regulated supplements, so he would be taking it at his own risk.   recommend repeat lab draw once he has been on the supplement for 3 months.            Other Visit Diagnoses       Preventative health care    -  Primary     ALL labs discussed. AWV form filled out for pt.    Transaminitis        Suspect exercise induced given he worked out prior to initial test, however, we will get US.    Relevant Orders    US Abdomen Limited          Procedures     Orders Only on 05/24/2024   Component Date Value Ref Range Status    Glucose 06/03/2024 86  74 - 106 mg/dL Final    BUN 06/03/2024 23.3 (H)  6 - 20 mg/dL Final    Creatinine 06/03/2024 1.14  0.70 - 1.20 mg/dL Final    AST 06/03/2024 31  0 - 40 U/L Final    ALT (SGPT) 06/03/2024 93 (H)  0 - 41 U/L Final    Alkaline Phosphatase 06/03/2024 105  40 - 130 U/L Final    Calcium 06/03/2024 9.8  8.6 - 10.2 mg/dL Final    Protein, Total 06/03/2024 6.5  6.4 - 8.3 g/dL Final    Albumin 06/03/2024 4.3  3.5 - 5.2 g/dL Final    BILIRUBIN, TOTAL 06/03/2024 0.39  0.00 - 1.20 mg/dL Final    Sodium 06/03/2024 140  136 - 145 mmol/L Final    Potassium 06/03/2024 4.9  3.5 - 5.1 mmol/L Final    Chloride 06/03/2024 103  98 - 107 mmol/L Final    CO2 06/03/2024 26  22 - 29 mmol/L Final    Globulin 06/03/2024 2.2  1.5 - 4.5 g/dL Final    Albumin/Globulin Ratio 06/03/2024 2.0  1.0 - 2.7 Final    BUN/Creatinine Ratio 06/03/2024 20.4 (H)  6 - 20 Final    GFR ESTIMATION 06/03/2024 83.38  >60.00 mL/min/1.73m2 Final    Anion Gap 06/03/2024 11.0  8.0 - 17.0 mmol/L Final    Comment: NOTE  Testing performed at:  [facility], [address] [place] [address]  [address] CLIA #:[id number]     Orders Only on 05/20/2024   Component Date Value Ref Range Status    VITAMIN D (25OHD) 05/24/2024 53.0  >30 ng/mL Final    Comment: Expected values:    Deficient                 <20 ng/mL  Insufficient              21-29 ng/mL  Sufficient                >30 ng/mL  NOTE  Testing performed at:  [facility], [address] [place] [address]  [address] CLIA #:[id number]      Cholesterol 05/24/2024 164  100 - 200 mg/dL Final    Comment: Desirable  <200  Borderline 200-240  High risk  >240      Triglycerides 05/24/2024 50  0 - 150  mg/dL Final    HDL 05/24/2024 70  >60 mg/dL Final    Comment: <40 mg/dL Major risk factor for CHD  >60 mg/dL Negative risk factor for CHD      LDL Cholesterol 05/24/2024 84.0  0 - 100 mg/dL Final    LDL/HDL Ratio 05/24/2024 1.2  1 - 3 Final    Comment: NOTE  Testing performed at:  [facility], [address] [place], [place] [id number] CLIA #:[id number]      Hemoglobin A1C 05/24/2024 5.3  4.0 - 6.0 % Final    EST AVERAGE GLUCOSE 05/24/2024 106  NORMAL MG/DL Final    Comment: NOTE  Testing performed at:  [facility], [address] [place] [address]  [address] CLIA #:[id number]      Glucose 05/24/2024 77  74 - 106 mg/dL Final    BUN 05/24/2024 26.5 (H)  6 - 20 mg/dL Final    Creatinine 05/24/2024 1.32 (H)  0.70 - 1.20 mg/dL Final    Recommend repeat creatinine within 90 days.    AST 05/24/2024 128 (H)  0 - 40 U/L Final    ALT (SGPT) 05/24/2024 204 (H)  0 - 41 U/L Final    Alkaline Phosphatase 05/24/2024 104  40 - 130 U/L Final    Calcium 05/24/2024 9.8  8.6 - 10.2 mg/dL Final    Protein, Total 05/24/2024 6.6  6.4 - 8.3 g/dL Final    Albumin 05/24/2024 4.4  3.5 - 5.2 g/dL Final    BILIRUBIN, TOTAL 05/24/2024 0.35  0.00 - 1.20 mg/dL Final    Sodium 05/24/2024 140  136 - 145 mmol/L Final    Potassium 05/24/2024 4.3  3.5 - 5.1 mmol/L Final    Chloride 05/24/2024 103  98 - 107 mmol/L Final    CO2 05/24/2024 21 (L)  22 - 29 mmol/L Final    Globulin 05/24/2024 2.2  1.5 - 4.5 g/dL Final    Albumin/Globulin Ratio 05/24/2024 2.0  1.0 - 2.7 Final    BUN/Creatinine Ratio 05/24/2024 20.1 (H)  6 - 20 Final    GFR ESTIMATION 05/24/2024 69.93  >60.00 mL/min/1.73m2 Final    Anion Gap 05/24/2024 16.0  8.0 - 17.0 mmol/L Final    Comment: NOTE  Testing performed at:  [facility], [address] [place] [address] CLIA #:[id number]      WBC 05/24/2024 6.04  4.3 - 10.8 X 10 3/ul Final    RBC 05/24/2024 5.35  4.7 - 6.1 X 10 6/ul Final    RDW-SD 05/24/2024 43.6  37 - 54 fl Final    Hemoglobin 05/24/2024  15.3  14 - 18 g/dL Final    Hematocrit 05/24/2024 47.0  42 - 52 % Final    MCV 05/24/2024 87.9  80 - 94 fl Final    MCH 05/24/2024 28.6  27 - 32 pg Final    MCHC 05/24/2024 32.6  32 - 36 g/dL Final    Platelets 05/24/2024 204  135 - 400 X 10 3/ul Final    Neutrophils 05/24/2024 47.0  34 - 71.1 % Final    Lymphocytes 05/24/2024 43.4  19.3 - 53.1 % Final    Monocytes 05/24/2024 7.0  4.7 - 12.5 % Final    Eosinophils 05/24/2024 1.7  0.7 - 7.0 % Final    Basophils 05/24/2024 0.7  0.2 - 1.2 % Final    Neutrophils Absolute 05/24/2024 2.85  2.15 - 7.56 X 10 3/ul Final    Lymphocytes Absolute 05/24/2024 2.62  0.86 - 4.75 X 10 3/ul Final    Monocytes Absolute 05/24/2024 0.42  0.22 - 1.08 X 10 3/ul Final    Eosinophils Absolute 05/24/2024 0.10  0.04 - 0.54 X 10 3/ul Final    Basophils Absolute 05/24/2024 0.04  0.00 - 0.22 X 10 3/ul Final    Immature Granulocytes Absolute 05/24/2024 0.01  0 - 0.04 X 10 3/ul Final    Immature Granulocytes 05/24/2024 0.2  0 - 0.5 % Final    IG includes metamyelocytes, myelocytes, and promyelocytes    nRBC# 05/24/2024 0.0  0 - 0.2 /100 WBC Final    nRBC Count Absolute 05/24/2024 0.000  0 - 0.012 x 10 3/ul Final    Comment: NOTE  Testing performed at:  [facility], [address] [place] [address]  [address] CLIA #:[id number]      Free Testosterone 05/24/2024 6.65  1.0 - 26.0 ng/dL Final    Comment: NOTE  Testing performed at:  [facility], [address] [place] [address]  [address] CLIA #:[id number]      Testosterone, Bioavailable 05/24/2024 166  80.0 - 614.0 ng/dL Final    SHBG 05/24/2024 31.0  16.5 - 55.9 nmol/L Final    Testosterone 05/24/2024 333  249 - 836 ng/dL Final    Comment: NOTE  Testing performed at:  [facility], [address] [place] [address]  [address] CLIA #:[id number]      TSH 05/24/2024 1.95  0.27 - 4.20 uIU/mL Final    Comment: NOTE  Testing performed at:  [facility], [address] [place] [address]  [address] CLIA #:[id number]      T4, Free  05/24/2024 1.55  0.93 - 1.70 ng/dL Final    Comment: NOTE  Testing performed at:  [facility], [address] [facility] [place]  [address] CLIA #:[id number]          No results found in the last 30 days.       Duration of encounter:  minutes  This includes face-to-face time and non face-to-face time preparing to see the patient (eg, review of tests), obtaining and/or reviewing separately obtained history, documenting clinical information in the electronic or other health record, independently interpreting resultsand communicating results to the patient/family/caregiver, or care coordination      DISCLAIMER: This note was prepared with [employer] voice recognition transcription software. Garbled syntax, mangled pronouns, and other bizarre constructions may be attributed to that software system.     All diagnostic data (labs/imaging) was reviewed with the patient and/or family member in the room.  All questions were answered to their liking. The patient and/or family member voiced understanding of all instructions provided. Expectations regarding follow up and treatment plan were voiced and confirmed prior to departure. The patient was given orders/instructions at the end of the visit for reference. They were instructed to notify my office if they have not been contacted for imaging/referrals/labs/results in 1-2 weeks. They voiced understanding of all of the above.     Follow up:     Patient Instructions   Testosterone replacement therapy  Absolute contraindications are bladder cancer, DVT, prostate cancer, pulmonary embolism, heart disease.     Potential side effects of testosterone therapy were discussed including but not limited to:  Increase risk prostate cancer, erythrocytosis, venous thromboembolism, cardiovascular risk, skin irritation, liver function abnormalities, worsened BPH, neurocognitive changes.       Follow up in about 1 year (around 6/4/2025), or if symptoms worsen or fail to improve.

## 2024-06-04 ENCOUNTER — OFFICE VISIT (OUTPATIENT)
Dept: FAMILY MEDICINE | Facility: CLINIC | Age: 39
End: 2024-06-04
Payer: COMMERCIAL

## 2024-06-04 VITALS
BODY MASS INDEX: 26.51 KG/M2 | RESPIRATION RATE: 10 BRPM | HEIGHT: 69 IN | DIASTOLIC BLOOD PRESSURE: 70 MMHG | WEIGHT: 179 LBS | SYSTOLIC BLOOD PRESSURE: 115 MMHG | TEMPERATURE: 98 F | HEART RATE: 55 BPM

## 2024-06-04 DIAGNOSIS — R79.89 LOW TESTOSTERONE: ICD-10-CM

## 2024-06-04 DIAGNOSIS — Z00.00 PREVENTATIVE HEALTH CARE: Primary | ICD-10-CM

## 2024-06-04 DIAGNOSIS — R74.01 TRANSAMINITIS: ICD-10-CM

## 2024-06-04 PROCEDURE — 1159F MED LIST DOCD IN RCRD: CPT | Mod: CPTII,S$GLB,, | Performed by: NURSE PRACTITIONER

## 2024-06-04 PROCEDURE — 3044F HG A1C LEVEL LT 7.0%: CPT | Mod: CPTII,S$GLB,, | Performed by: NURSE PRACTITIONER

## 2024-06-04 PROCEDURE — 99396 PREV VISIT EST AGE 40-64: CPT | Mod: S$GLB,,, | Performed by: NURSE PRACTITIONER

## 2024-06-04 PROCEDURE — 3078F DIAST BP <80 MM HG: CPT | Mod: CPTII,S$GLB,, | Performed by: NURSE PRACTITIONER

## 2024-06-04 PROCEDURE — 3074F SYST BP LT 130 MM HG: CPT | Mod: CPTII,S$GLB,, | Performed by: NURSE PRACTITIONER

## 2024-06-04 PROCEDURE — 3008F BODY MASS INDEX DOCD: CPT | Mod: CPTII,S$GLB,, | Performed by: NURSE PRACTITIONER

## 2024-06-04 NOTE — PATIENT INSTRUCTIONS
Testosterone replacement therapy  Absolute contraindications are bladder cancer, DVT, prostate cancer, pulmonary embolism, heart disease.     Potential side effects of testosterone therapy were discussed including but not limited to:  Increase risk prostate cancer, erythrocytosis, venous thromboembolism, cardiovascular risk, skin irritation, liver function abnormalities, worsened BPH, neurocognitive changes.

## 2024-06-04 NOTE — ASSESSMENT & PLAN NOTE
Free T/Total are borderline for age.     We discussed Clomid versus testosterone replacement therapy. See below for discussion.       Testosterone replacement therapy  Absolute contraindications are bladder cancer, DVT, prostate cancer, pulmonary embolism, heart disease.     Potential side effects of testosterone therapy were discussed including but not limited to:  Increase risk prostate cancer, erythrocytosis, venous thromboembolism, cardiovascular risk, skin irritation, liver function abnormalities, worsened BPH, neurocognitive changes.         PLAN    He would like to try OTC supplement. I can not comment on non-FDA regulated supplements, so he would be taking it at his own risk.   recommend repeat lab draw once he has been on the supplement for 3 months.

## 2024-06-17 DIAGNOSIS — R16.0 HEPATOMEGALY: ICD-10-CM

## 2024-06-17 DIAGNOSIS — Z79.899 LONG TERM CURRENT USE OF THERAPEUTIC DRUG: ICD-10-CM

## 2024-06-17 DIAGNOSIS — R53.83 FATIGUE, UNSPECIFIED TYPE: ICD-10-CM

## 2024-06-17 DIAGNOSIS — Z11.59 NEED FOR HEPATITIS B SCREENING TEST: ICD-10-CM

## 2024-06-17 DIAGNOSIS — Z12.9 SCREENING FOR CANCER: ICD-10-CM

## 2024-06-17 DIAGNOSIS — R74.01 TRANSAMINITIS: Primary | ICD-10-CM

## 2024-06-17 RX ORDER — ESCITALOPRAM OXALATE 10 MG/1
TABLET ORAL
Qty: 90 TABLET | Refills: 3 | Status: SHIPPED | OUTPATIENT
Start: 2024-06-17

## 2024-06-18 LAB
%TRANSFERRIN SATURATION: 32.1 % (ref 20–50)
ADDITIONAL TESTING: NORMAL
ALPHA FETOPROTEIN MATERNAL: <1.82 NG/ML (ref 0–8.3)
ANA SER-ACNC: NEGATIVE
FERRITIN: 99.4 NG/ML (ref 20–380)
HBV SURFACE AB SER-ACNC: POSITIVE M[IU]/ML
HBV SURFACE AG SERPL QL IA: NONREACTIVE
INR PPP: 1.08 (ref 0.88–1.12)
IRON BINDING CAPACITY: 308 UG/DL (ref 262–472)
IRON SERPL-MCNC: 99 UG/DL (ref 59–158)
PROTIME: 11.2 SECONDS (ref 9.3–11.5)
PTT: 23.7 SECONDS (ref 21.8–32.2)
UIBC SERPL-MCNC: 209 UG/DL (ref 112–346)

## 2024-06-26 DIAGNOSIS — Z79.899 LONG TERM CURRENT USE OF THERAPEUTIC DRUG: ICD-10-CM

## 2024-06-26 DIAGNOSIS — R74.01 TRANSAMINITIS: Primary | ICD-10-CM

## 2024-06-26 DIAGNOSIS — R16.0 HEPATOMEGALY: ICD-10-CM

## 2024-09-25 DIAGNOSIS — Z79.899 LONG TERM CURRENT USE OF THERAPEUTIC DRUG: ICD-10-CM

## 2024-09-25 DIAGNOSIS — R74.01 TRANSAMINITIS: Primary | ICD-10-CM

## 2024-09-25 DIAGNOSIS — R53.83 FATIGUE, UNSPECIFIED TYPE: ICD-10-CM

## 2024-09-25 DIAGNOSIS — R16.0 HEPATOMEGALY: ICD-10-CM

## 2024-09-25 DIAGNOSIS — R79.89 LOW TESTOSTERONE: ICD-10-CM

## 2025-05-12 RX ORDER — BUPROPION HYDROCHLORIDE 150 MG/1
150 TABLET ORAL DAILY
Qty: 90 TABLET | Refills: 3 | Status: SHIPPED | OUTPATIENT
Start: 2025-05-12 | End: 2026-05-12

## 2025-05-19 RX ORDER — BUPROPION HYDROCHLORIDE 150 MG/1
150 TABLET ORAL DAILY
Qty: 90 TABLET | Refills: 3 | Status: SHIPPED | OUTPATIENT
Start: 2025-05-19

## 2025-06-09 ENCOUNTER — OFFICE VISIT (OUTPATIENT)
Dept: FAMILY MEDICINE | Facility: CLINIC | Age: 40
End: 2025-06-09
Payer: COMMERCIAL

## 2025-06-09 VITALS
OXYGEN SATURATION: 98 % | HEART RATE: 76 BPM | WEIGHT: 187 LBS | BODY MASS INDEX: 27.62 KG/M2 | SYSTOLIC BLOOD PRESSURE: 133 MMHG | DIASTOLIC BLOOD PRESSURE: 78 MMHG

## 2025-06-09 DIAGNOSIS — F41.1 GENERALIZED ANXIETY DISORDER: ICD-10-CM

## 2025-06-09 DIAGNOSIS — Z00.00 PREVENTATIVE HEALTH CARE: Primary | ICD-10-CM

## 2025-06-09 DIAGNOSIS — Z79.899 LONG TERM USE OF DRUG: ICD-10-CM

## 2025-06-09 DIAGNOSIS — R79.89 LOW TESTOSTERONE: ICD-10-CM

## 2025-06-09 DIAGNOSIS — G47.00 INSOMNIA, UNSPECIFIED TYPE: ICD-10-CM

## 2025-06-09 PROCEDURE — 3075F SYST BP GE 130 - 139MM HG: CPT | Mod: CPTII,S$GLB,, | Performed by: NURSE PRACTITIONER

## 2025-06-09 PROCEDURE — 3078F DIAST BP <80 MM HG: CPT | Mod: CPTII,S$GLB,, | Performed by: NURSE PRACTITIONER

## 2025-06-09 PROCEDURE — 1159F MED LIST DOCD IN RCRD: CPT | Mod: CPTII,S$GLB,, | Performed by: NURSE PRACTITIONER

## 2025-06-09 PROCEDURE — 99396 PREV VISIT EST AGE 40-64: CPT | Mod: S$GLB,,, | Performed by: NURSE PRACTITIONER

## 2025-06-09 PROCEDURE — 3008F BODY MASS INDEX DOCD: CPT | Mod: CPTII,S$GLB,, | Performed by: NURSE PRACTITIONER

## 2025-06-09 RX ORDER — TRAZODONE HYDROCHLORIDE 50 MG/1
50 TABLET ORAL NIGHTLY PRN
Qty: 30 TABLET | Refills: 11 | Status: SHIPPED | OUTPATIENT
Start: 2025-06-09 | End: 2026-06-09

## 2025-06-09 NOTE — PROGRESS NOTES
Subjective:      Patient ID: Ronnell Grande is a 39 y.o. male.    Chief Complaint: Annual Exam      39 year-old male    Past medical history of vitamin-D deficiency and generalized anxiety disorder      Here for annual wellness visit.   He reports he eating healthy and working out 5-6 days a week.         Primary complaint today is generalized anxiety and insomnia.  Reports waking up at all hours.  States he was unable to tolerate Wellbutrin due to side effects.  He stopped Lexapro due to decreased libido.  His testosterone has been the 600 range.  Denies SI, HI, delusion, grandiosity, FOI, anhedonia.       No other issues reported      Past Medical History:   Diagnosis Date    EBV positive mononucleosis syndrome     Generalized anxiety disorder     Low vitamin D level     Smoker       Social History     Socioeconomic History    Marital status:    Tobacco Use    Smoking status: Former     Types: Cigarettes    Smokeless tobacco: Never   Substance and Sexual Activity    Alcohol use: Never    Drug use: Never     Social Drivers of Health     Financial Resource Strain: Low Risk  (6/3/2024)    Overall Financial Resource Strain (CARDIA)     Difficulty of Paying Living Expenses: Not hard at all   Food Insecurity: No Food Insecurity (6/3/2024)    Hunger Vital Sign     Worried About Running Out of Food in the Last Year: Never true     Ran Out of Food in the Last Year: Never true   Physical Activity: Sufficiently Active (6/3/2024)    Exercise Vital Sign     Days of Exercise per Week: 6 days     Minutes of Exercise per Session: 90 min   Stress: No Stress Concern Present (6/3/2024)    [employer] - Occupational Stress Questionnaire     Feeling of Stress : Only a little   Housing Stability: Unknown (6/3/2024)    Housing Stability Vital Sign     Unable to Pay for Housing in the Last Year: No      Family History   Family history unknown: Yes        ROS:   Review of Systems   Constitutional:   Negative for activity change, appetite change, chills, fatigue, fever and unexpected weight change.   HENT:  Negative for congestion, dental problem, hearing loss, mouth sores, sore throat, trouble swallowing and voice change.    Eyes:  Negative for pain, redness and visual disturbance.   Respiratory:  Negative for cough, shortness of breath and wheezing.    Cardiovascular:  Negative for chest pain and leg swelling.   Gastrointestinal:  Negative for abdominal distention, abdominal pain, diarrhea, nausea and vomiting.   Endocrine: Negative for polyuria.   Genitourinary:  Negative for decreased urine volume, dysuria and frequency.   Musculoskeletal:  Negative for joint swelling and neck stiffness.   Skin:  Negative for rash.   Allergic/Immunologic: Negative for immunocompromised state.   Neurological:  Negative for dizziness, weakness and headaches.   Hematological:  Negative for adenopathy.   Psychiatric/Behavioral:  Negative for agitation, confusion, hallucinations and suicidal ideas. The patient is not nervous/anxious.    All other systems reviewed and are negative.    Objective:   Physical Exam  Vitals and nursing note reviewed.   Constitutional:       General: He is not in acute distress.     Appearance: Normal appearance. He is well-developed. He is not ill-appearing.   HENT:      Head: Normocephalic and atraumatic.      Right Ear: External ear normal.      Left Ear: External ear normal.      Nose: Nose normal.      Mouth/Throat:      Mouth: Mucous membranes are moist.   Eyes:      General: No scleral icterus.     Pupils: Pupils are equal, round, and reactive to light.   Cardiovascular:      Rate and Rhythm: Normal rate and regular rhythm.      Pulses: Normal pulses.      Heart sounds: Normal heart sounds. No murmur heard.  Pulmonary:      Effort: Pulmonary effort is normal.      Breath sounds: Normal breath sounds. No wheezing or rales.   Abdominal:      General: Abdomen is flat. Bowel sounds are normal.       Palpations: Abdomen is soft. There is no hepatomegaly.      Tenderness: There is no abdominal tenderness.   Musculoskeletal:         General: Normal range of motion.      Cervical back: Normal range of motion and neck supple.   Lymphadenopathy:      Cervical: No cervical adenopathy.      Upper Body:      Right upper body: No supraclavicular adenopathy.      Left upper body: No supraclavicular adenopathy.   Skin:     General: Skin is warm and dry.      Capillary Refill: Capillary refill takes less than 2 seconds.   Neurological:      Mental Status: He is alert and oriented to person, place, and time. Mental status is at baseline.   Psychiatric:         Attention and Perception: Attention and perception normal.         Mood and Affect: Mood and affect normal.         Speech: Speech normal.         Behavior: Behavior normal. Behavior is cooperative.         Thought Content: Thought content normal.         Cognition and Memory: Cognition and memory normal.         Judgment: Judgment normal.       Assessment:     1. Preventative health care    2. Generalized anxiety disorder    3. Insomnia, unspecified type    4. Low testosterone    5. Long term use of drug      No images are attached to the encounter.   Plan:     Problem List Items Addressed This Visit          Psychiatric    Generalized anxiety disorder    Relevant Medications    traZODone (DESYREL) 50 MG tablet    Other Relevant Orders    Urinalysis, Reflex to Urine Culture Urine, Clean Catch    TSH+Free T4    Lipid Panel    Hemoglobin A1C    Comprehensive Metabolic Panel    CBC Auto Differential    Testosterone,Total       Endocrine    Low testosterone    Relevant Orders    Urinalysis, Reflex to Urine Culture Urine, Clean Catch    TSH+Free T4    Lipid Panel    Hemoglobin A1C    Comprehensive Metabolic Panel    CBC Auto Differential    Testosterone,Total     Other Visit Diagnoses         Preventative health care    -  Primary    Relevant Orders    Urinalysis, Reflex  to Urine Culture Urine, Clean Catch    TSH+Free T4    Lipid Panel    Hemoglobin A1C    Comprehensive Metabolic Panel    CBC Auto Differential    Testosterone,Total      Insomnia, unspecified type        Relevant Medications    traZODone (DESYREL) 50 MG tablet    Other Relevant Orders    Urinalysis, Reflex to Urine Culture Urine, Clean Catch    TSH+Free T4    Lipid Panel    Hemoglobin A1C    Comprehensive Metabolic Panel    CBC Auto Differential    Testosterone,Total      Long term use of drug        Relevant Orders    Urinalysis, Reflex to Urine Culture Urine, Clean Catch    TSH+Free T4    Lipid Panel    Hemoglobin A1C    Comprehensive Metabolic Panel    CBC Auto Differential    Testosterone,Total          Procedures     No visits with results within 1 Month(s) from this visit.   Latest known visit with results is:   Orders Only on 01/24/2025   Component Date Value Ref Range Status    Testosterone 01/28/2025 638  249 - 836 ng/dL Final    Comment: TESTOSTERONE LEVELS SHOULD BE COLLECTED IN THE EARLY MORNING (7AM-  10AM), FOR ACCURACY, AND BEFORE THE NEXT DOSE IF RECEIVING THERAPY.  NOTE  Testing performed at:  [facility], [address] [place] [address] CLIA #:[id number]          No results found in the last 30 days.       Duration of encounter:  minutes  This includes face-to-face time and non face-to-face time preparing to see the patient (eg, review of tests), obtaining and/or reviewing separately obtained history, documenting clinical information in the electronic or other health record, independently interpreting resultsand communicating results to the patient/family/caregiver, or care coordination      DISCLAIMER: This note was prepared with [employer] voice recognition transcription software. Garbled syntax, mangled pronouns, and other bizarre constructions may be attributed to that software system.     All diagnostic data (labs/imaging) was reviewed with the patient and/or family member in  the room. All preventative measures (vaccinations, screenings, testing, imaging) were discussed in depth and offered to the patient in accordance with current medical guidelines to ensure the patient is up to date.  All questions were answered to their liking. The patient and/or family member voiced understanding of all instructions provided. Expectations regarding follow up and treatment plan were voiced and confirmed prior to departure. The patient was given orders/instructions at the end of the visit for reference. They were instructed to notify my office if they have not been contacted for imaging/referrals/labs/results in 1-2 weeks. They voiced understanding of all of the above.     Follow up:     There are no Patient Instructions on file for this visit.     Follow up in about 1 year (around 6/9/2026), or if symptoms worsen or fail to improve.

## 2025-06-23 RX ORDER — CITALOPRAM 10 MG/1
10 TABLET ORAL DAILY
Qty: 30 TABLET | Refills: 11 | Status: SHIPPED | OUTPATIENT
Start: 2025-06-23 | End: 2026-06-23

## 2025-06-23 RX ORDER — TRAZODONE HYDROCHLORIDE 100 MG/1
100 TABLET ORAL NIGHTLY PRN
Qty: 90 TABLET | Refills: 3 | Status: SHIPPED | OUTPATIENT
Start: 2025-06-23 | End: 2026-06-23

## 2025-06-27 ENCOUNTER — OFFICE VISIT (OUTPATIENT)
Dept: FAMILY MEDICINE | Facility: CLINIC | Age: 40
End: 2025-06-27
Payer: COMMERCIAL

## 2025-06-27 VITALS
WEIGHT: 185 LBS | HEART RATE: 82 BPM | SYSTOLIC BLOOD PRESSURE: 112 MMHG | DIASTOLIC BLOOD PRESSURE: 70 MMHG | OXYGEN SATURATION: 98 % | BODY MASS INDEX: 27.32 KG/M2

## 2025-06-27 DIAGNOSIS — F41.1 GENERALIZED ANXIETY DISORDER: Primary | ICD-10-CM

## 2025-06-27 DIAGNOSIS — G47.00 INSOMNIA, UNSPECIFIED TYPE: ICD-10-CM

## 2025-06-27 NOTE — ASSESSMENT & PLAN NOTE
Doing well on trazodone, however, it takes 4 hours for the medication to kick in...  Alternatives would  include amitriptyline, Lunesta, Restoril.  We will revisit this if needed

## 2025-06-27 NOTE — PROGRESS NOTES
Subjective:      Patient ID: Ronnell Grande is a 39 y.o. male.    Chief Complaint: Follow-up      39 year-old male    Past medical history of vitamin-D deficiency and generalized anxiety disorder      Patient is here today for 2 week follow-up regarding generalized anxiety and insomnia.  He reports doing well on trazodone 100 mg.  It does take him 4 hours to feel the effects of the medication but he sleeps throughout the night.  He has to take the medication around 6:00 p.m. in order to fall asleep around 10:00 p.m..      He has recently started on Celexa 10 mg.  Started this medication 3 days ago.  He was having erectile dysfunction with Lexapro.  No longer having ED with Celexa.  He continues to have anxiety and is scatterbrained.  Depression is controlled.  Denies SI, HI, delusion, grandiosity, FOI, anhedonia.       No other issues reported      Past Medical History:   Diagnosis Date    EBV positive mononucleosis syndrome     Generalized anxiety disorder     Low vitamin D level     Smoker       Social History     Socioeconomic History    Marital status:    Tobacco Use    Smoking status: Former     Types: Cigarettes    Smokeless tobacco: Never   Substance and Sexual Activity    Alcohol use: Never    Drug use: Never     Social Drivers of Health     Financial Resource Strain: Low Risk  (6/3/2024)    Overall Financial Resource Strain (CARDIA)     Difficulty of Paying Living Expenses: Not hard at all   Food Insecurity: No Food Insecurity (6/3/2024)    Hunger Vital Sign     Worried About Running Out of Food in the Last Year: Never true     Ran Out of Food in the Last Year: Never true   Physical Activity: Sufficiently Active (6/3/2024)    Exercise Vital Sign     Days of Exercise per Week: 6 days     Minutes of Exercise per Session: 90 min   Stress: No Stress Concern Present (6/3/2024)    [employer] - Occupational Stress Questionnaire     Feeling of Stress : Only a little   Housing  Stability: Unknown (6/3/2024)    Housing Stability Vital Sign     Unable to Pay for Housing in the Last Year: No      Family History   Family history unknown: Yes        ROS:   Review of Systems   Constitutional:  Negative for appetite change, chills and fever.   Respiratory:  Negative for chest tightness and shortness of breath.    Cardiovascular:  Negative for chest pain and palpitations.   Gastrointestinal:  Negative for abdominal pain, diarrhea and vomiting.   Genitourinary:  Negative for difficulty urinating, flank pain and hematuria.   Musculoskeletal:  Negative for back pain and neck pain.   Skin:  Negative for rash.   Neurological:  Negative for dizziness, tremors, seizures, weakness, light-headedness and headaches.   Psychiatric/Behavioral:  Negative for agitation, confusion, decreased concentration, dysphoric mood, hallucinations, sleep disturbance and suicidal ideas. The patient is not nervous/anxious.      Objective:   Physical Exam  Vitals and nursing note reviewed.   Constitutional:       General: He is awake. He is not in acute distress.     Appearance: Normal appearance. He is well-developed, well-groomed and normal weight. He is not ill-appearing.   Eyes:      General: No scleral icterus.  Cardiovascular:      Rate and Rhythm: Normal rate.   Pulmonary:      Effort: Pulmonary effort is normal.   Skin:     Coloration: Skin is not jaundiced.      Findings: No rash.   Neurological:      Mental Status: He is alert and oriented to person, place, and time. Mental status is at baseline.   Psychiatric:         Attention and Perception: Attention and perception normal.         Mood and Affect: Mood and affect normal.         Speech: Speech normal.         Behavior: Behavior normal. Behavior is cooperative.         Thought Content: Thought content normal.         Cognition and Memory: Cognition and memory normal.         Judgment: Judgment normal.       Assessment:     1. Generalized anxiety disorder    2.  Insomnia, unspecified type      No images are attached to the encounter.   Plan:     Problem List Items Addressed This Visit          Psychiatric    Generalized anxiety disorder - Primary    Current Assessment & Plan   Still with anxiety...  But still early in the course of the medication.  I would expect some improvement in 2 weeks.        PLAN     Celexa 20 mg.  Revisit in 2 weeks.  Alternatives would be Fetzima or trintellix.   Continue trazodone.             Other    Insomnia    Current Assessment & Plan       Doing well on trazodone, however, it takes 4 hours for the medication to kick in...  Alternatives would  include amitriptyline, Lunesta, Restoril.  We will revisit this if needed          Procedures     No visits with results within 1 Month(s) from this visit.   Latest known visit with results is:   Orders Only on 01/24/2025   Component Date Value Ref Range Status    Testosterone 01/28/2025 638  249 - 836 ng/dL Final    Comment: TESTOSTERONE LEVELS SHOULD BE COLLECTED IN THE EARLY MORNING (7AM-  10AM), FOR ACCURACY, AND BEFORE THE NEXT DOSE IF RECEIVING THERAPY.  NOTE  Testing performed at:  [facility], [address] [place] [address]  [address] CLIA #:[id number]          No results found in the last 30 days.       Duration of encounter:  minutes  This includes face-to-face time and non face-to-face time preparing to see the patient (eg, review of tests), obtaining and/or reviewing separately obtained history, documenting clinical information in the electronic or other health record, independently interpreting resultsand communicating results to the patient/family/caregiver, or care coordination      DISCLAIMER: This note was prepared with [employer] voice recognition transcription software. Garbled syntax, mangled pronouns, and other bizarre constructions may be attributed to that software system.     All diagnostic data (labs/imaging) was reviewed with the patient and/or family member in the room.  All preventative measures (vaccinations, screenings, testing, imaging) were discussed in depth and offered to the patient in accordance with current medical guidelines to ensure the patient is up to date.  All questions were answered to their liking. The patient and/or family member voiced understanding of all instructions provided. Expectations regarding follow up and treatment plan were voiced and confirmed prior to departure. The patient was given orders/instructions at the end of the visit for reference. They were instructed to notify my office if they have not been contacted for imaging/referrals/labs/results in 1-2 weeks. They voiced understanding of all of the above.     Follow up:     There are no Patient Instructions on file for this visit.     Follow up in about 6 months (around 12/27/2025), or if symptoms worsen or fail to improve.

## 2025-06-27 NOTE — ASSESSMENT & PLAN NOTE
Still with anxiety...  But still early in the course of the medication.  I would expect some improvement in 2 weeks.        PLAN     Celexa 20 mg.  Revisit in 2 weeks.  Alternatives would be Fetzima or trintellix.   Continue trazodone.

## 2025-07-08 RX ORDER — CITALOPRAM 20 MG/1
20 TABLET ORAL DAILY
Qty: 90 TABLET | Refills: 3 | Status: SHIPPED | OUTPATIENT
Start: 2025-07-08 | End: 2026-07-08

## 2025-07-14 RX ORDER — CITALOPRAM 20 MG/1
20 TABLET ORAL DAILY
Qty: 90 TABLET | Refills: 3 | Status: SHIPPED | OUTPATIENT
Start: 2025-07-14

## 2025-07-14 RX ORDER — TRAZODONE HYDROCHLORIDE 50 MG/1
100 TABLET ORAL NIGHTLY PRN
Qty: 180 TABLET | Refills: 3 | Status: SHIPPED | OUTPATIENT
Start: 2025-07-14